# Patient Record
Sex: FEMALE | Race: OTHER | Employment: FULL TIME | URBAN - METROPOLITAN AREA
[De-identification: names, ages, dates, MRNs, and addresses within clinical notes are randomized per-mention and may not be internally consistent; named-entity substitution may affect disease eponyms.]

---

## 2020-02-25 PROBLEM — R10.9 RIGHT FLANK PAIN: Status: ACTIVE | Noted: 2020-02-25

## 2020-02-25 PROBLEM — N20.0 CALCULUS OF RIGHT KIDNEY: Status: ACTIVE | Noted: 2020-02-25

## 2020-03-18 ENCOUNTER — HOSPITAL ENCOUNTER (OUTPATIENT)
Dept: ULTRASOUND IMAGING | Age: 65
Discharge: HOME | End: 2020-03-18
Attending: UROLOGY
Payer: COMMERCIAL

## 2020-03-18 DIAGNOSIS — N20.0 CALCULUS OF RIGHT KIDNEY: ICD-10-CM

## 2020-03-18 PROCEDURE — 76770 US EXAM ABDO BACK WALL COMP: CPT

## 2020-03-19 NOTE — PROGRESS NOTES
Unclear reason for hydronephrosis but immediate post op URS  Will observe for now   CR stable  Pt asymptomatic.  F/u in 3 months

## 2020-05-20 ENCOUNTER — HOSPITAL ENCOUNTER (OUTPATIENT)
Dept: LAB | Age: 65
Discharge: HOME | End: 2020-05-20
Payer: COMMERCIAL

## 2020-05-20 ENCOUNTER — HOSPITAL ENCOUNTER (OUTPATIENT)
Dept: PREADMISSION TESTING | Age: 65
Discharge: HOME | End: 2020-05-20
Payer: COMMERCIAL

## 2020-05-20 DIAGNOSIS — N81.4 CYSTOCELE WITH PROLAPSE: ICD-10-CM

## 2020-05-20 DIAGNOSIS — Z01.818 PRE-OP TESTING: ICD-10-CM

## 2020-05-20 LAB
ABO + RH BLD: NORMAL
ANION GAP SERPL CALC-SCNC: 5 MMOL/L (ref 3–18)
APPEARANCE UR: CLEAR
ATRIAL RATE: 56 BPM
BILIRUB UR QL: NEGATIVE
BLOOD GROUP ANTIBODIES SERPL: NORMAL
BUN SERPL-MCNC: 17 MG/DL (ref 7–18)
BUN/CREAT SERPL: 18 (ref 12–20)
CALCIUM SERPL-MCNC: 9.1 MG/DL (ref 8.5–10.1)
CALCULATED P AXIS, ECG09: 58 DEGREES
CALCULATED R AXIS, ECG10: 15 DEGREES
CALCULATED T AXIS, ECG11: 58 DEGREES
CHLORIDE SERPL-SCNC: 106 MMOL/L (ref 100–111)
CO2 SERPL-SCNC: 28 MMOL/L (ref 21–32)
COLOR UR: YELLOW
CREAT SERPL-MCNC: 0.97 MG/DL (ref 0.6–1.3)
DIAGNOSIS, 93000: NORMAL
ERYTHROCYTE [DISTWIDTH] IN BLOOD BY AUTOMATED COUNT: 12.8 % (ref 11.6–14.5)
GLUCOSE SERPL-MCNC: 194 MG/DL (ref 74–99)
GLUCOSE UR STRIP.AUTO-MCNC: NEGATIVE MG/DL
HCT VFR BLD AUTO: 41.1 % (ref 35–45)
HGB BLD-MCNC: 14 G/DL (ref 12–16)
HGB UR QL STRIP: NEGATIVE
KETONES UR QL STRIP.AUTO: NEGATIVE MG/DL
LEUKOCYTE ESTERASE UR QL STRIP.AUTO: NEGATIVE
MCH RBC QN AUTO: 30.2 PG (ref 24–34)
MCHC RBC AUTO-ENTMCNC: 34.1 G/DL (ref 31–37)
MCV RBC AUTO: 88.6 FL (ref 74–97)
NITRITE UR QL STRIP.AUTO: NEGATIVE
P-R INTERVAL, ECG05: 214 MS
PH UR STRIP: 5 [PH] (ref 5–8)
PLATELET # BLD AUTO: 305 K/UL (ref 135–420)
PMV BLD AUTO: 10.4 FL (ref 9.2–11.8)
POTASSIUM SERPL-SCNC: 4.1 MMOL/L (ref 3.5–5.5)
PROT UR STRIP-MCNC: NEGATIVE MG/DL
Q-T INTERVAL, ECG07: 456 MS
QRS DURATION, ECG06: 98 MS
QTC CALCULATION (BEZET), ECG08: 440 MS
RBC # BLD AUTO: 4.64 M/UL (ref 4.2–5.3)
SODIUM SERPL-SCNC: 139 MMOL/L (ref 136–145)
SP GR UR REFRACTOMETRY: 1.02 (ref 1–1.03)
SPECIMEN EXP DATE BLD: NORMAL
UROBILINOGEN UR QL STRIP.AUTO: 0.2 EU/DL (ref 0.2–1)
VENTRICULAR RATE, ECG03: 56 BPM
WBC # BLD AUTO: 6 K/UL (ref 4.6–13.2)

## 2020-05-20 PROCEDURE — 93005 ELECTROCARDIOGRAM TRACING: CPT

## 2020-05-20 PROCEDURE — 36415 COLL VENOUS BLD VENIPUNCTURE: CPT

## 2020-05-20 PROCEDURE — 81003 URINALYSIS AUTO W/O SCOPE: CPT

## 2020-05-20 PROCEDURE — 86900 BLOOD TYPING SEROLOGIC ABO: CPT

## 2020-05-20 PROCEDURE — 87086 URINE CULTURE/COLONY COUNT: CPT

## 2020-05-20 PROCEDURE — 85027 COMPLETE CBC AUTOMATED: CPT

## 2020-05-20 PROCEDURE — 80048 BASIC METABOLIC PNL TOTAL CA: CPT

## 2020-05-21 LAB
BACTERIA SPEC CULT: NORMAL
SERVICE CMNT-IMP: NORMAL

## 2020-05-22 ENCOUNTER — HOSPITAL ENCOUNTER (OUTPATIENT)
Dept: LAB | Age: 65
Discharge: HOME | End: 2020-05-22
Payer: COMMERCIAL

## 2020-05-22 DIAGNOSIS — U07.1 COVID-19: ICD-10-CM

## 2020-05-22 PROCEDURE — 87635 SARS-COV-2 COVID-19 AMP PRB: CPT

## 2020-05-24 LAB — SARS-COV-2, COV2NT: NOT DETECTED

## 2020-05-27 ENCOUNTER — ANESTHESIA EVENT (OUTPATIENT)
Dept: SURGERY | Age: 65
DRG: 748 | End: 2020-05-27
Payer: COMMERCIAL

## 2020-05-28 ENCOUNTER — HOSPITAL ENCOUNTER (INPATIENT)
Age: 65
LOS: 1 days | Discharge: HOME | DRG: 748 | End: 2020-05-29
Attending: UROLOGY | Admitting: UROLOGY
Payer: COMMERCIAL

## 2020-05-28 ENCOUNTER — ANESTHESIA (OUTPATIENT)
Dept: SURGERY | Age: 65
DRG: 748 | End: 2020-05-28
Payer: COMMERCIAL

## 2020-05-28 DIAGNOSIS — N81.11 MIDLINE CYSTOCELE: Primary | ICD-10-CM

## 2020-05-28 PROBLEM — N81.9 PROLAPSE OF FEMALE PELVIC ORGANS: Status: ACTIVE | Noted: 2020-05-28

## 2020-05-28 PROCEDURE — 8E0W4CZ ROBOTIC ASSISTED PROCEDURE OF TRUNK REGION, PERCUTANEOUS ENDOSCOPIC APPROACH: ICD-10-PCS | Performed by: UROLOGY

## 2020-05-28 PROCEDURE — 77030039266 HC ADH SKN EXOFIN S2SG -A: Performed by: UROLOGY

## 2020-05-28 PROCEDURE — 77030018813 HC SCIS LAPSCP EPIX DISP AMR -B: Performed by: UROLOGY

## 2020-05-28 PROCEDURE — 77030002896 HC SUT CLP ABSRB J&J -B: Performed by: UROLOGY

## 2020-05-28 PROCEDURE — 74011250636 HC RX REV CODE- 250/636: Performed by: UROLOGY

## 2020-05-28 PROCEDURE — 99218 HC RM OBSERVATION: CPT

## 2020-05-28 PROCEDURE — 77030032490 HC SLV COMPR SCD KNE COVD -B: Performed by: UROLOGY

## 2020-05-28 PROCEDURE — 77030008477 HC STYL SATN SLP COVD -A: Performed by: ANESTHESIOLOGY

## 2020-05-28 PROCEDURE — 0UUG4JZ SUPPLEMENT VAGINA WITH SYNTHETIC SUBSTITUTE, PERCUTANEOUS ENDOSCOPIC APPROACH: ICD-10-PCS | Performed by: UROLOGY

## 2020-05-28 PROCEDURE — 74011250636 HC RX REV CODE- 250/636: Performed by: NURSE ANESTHETIST, CERTIFIED REGISTERED

## 2020-05-28 PROCEDURE — 77030008574 HC TBNG SUC IRR STRY -B: Performed by: UROLOGY

## 2020-05-28 PROCEDURE — 77010033678 HC OXYGEN DAILY

## 2020-05-28 PROCEDURE — 77030013079 HC BLNKT BAIR HGGR 3M -A: Performed by: ANESTHESIOLOGY

## 2020-05-28 PROCEDURE — 77030035277 HC OBTRTR BLDELSS DISP INTU -B: Performed by: UROLOGY

## 2020-05-28 PROCEDURE — 74011000272 HC RX REV CODE- 272: Performed by: UROLOGY

## 2020-05-28 PROCEDURE — 0DN84ZZ RELEASE SMALL INTESTINE, PERCUTANEOUS ENDOSCOPIC APPROACH: ICD-10-PCS | Performed by: UROLOGY

## 2020-05-28 PROCEDURE — 0DNN4ZZ RELEASE SIGMOID COLON, PERCUTANEOUS ENDOSCOPIC APPROACH: ICD-10-PCS | Performed by: UROLOGY

## 2020-05-28 PROCEDURE — 77030018842 HC SOL IRR SOD CL 9% BAXT -A: Performed by: UROLOGY

## 2020-05-28 PROCEDURE — 76210000016 HC OR PH I REC 1 TO 1.5 HR: Performed by: UROLOGY

## 2020-05-28 PROCEDURE — 77030022704 HC SUT VLOC COVD -B: Performed by: UROLOGY

## 2020-05-28 PROCEDURE — 74011250637 HC RX REV CODE- 250/637: Performed by: NURSE ANESTHETIST, CERTIFIED REGISTERED

## 2020-05-28 PROCEDURE — 74011000250 HC RX REV CODE- 250: Performed by: NURSE ANESTHETIST, CERTIFIED REGISTERED

## 2020-05-28 PROCEDURE — 0DNU4ZZ RELEASE OMENTUM, PERCUTANEOUS ENDOSCOPIC APPROACH: ICD-10-PCS | Performed by: UROLOGY

## 2020-05-28 PROCEDURE — 74011000250 HC RX REV CODE- 250: Performed by: UROLOGY

## 2020-05-28 PROCEDURE — C1781 MESH (IMPLANTABLE): HCPCS | Performed by: UROLOGY

## 2020-05-28 PROCEDURE — 76060000040 HC ANESTHESIA 4.5 TO 5 HR: Performed by: UROLOGY

## 2020-05-28 PROCEDURE — 77030008683 HC TU ET CUF COVD -A: Performed by: ANESTHESIOLOGY

## 2020-05-28 PROCEDURE — 77030016151 HC PROTCTR LNS DFOG COVD -B: Performed by: UROLOGY

## 2020-05-28 PROCEDURE — 0DNW4ZZ RELEASE PERITONEUM, PERCUTANEOUS ENDOSCOPIC APPROACH: ICD-10-PCS | Performed by: UROLOGY

## 2020-05-28 PROCEDURE — 76010000881 HC OR TIME 4.5 TO 5HR INTENSV - TIER 2: Performed by: UROLOGY

## 2020-05-28 PROCEDURE — 77030009848 HC PASSR SUT SET COOP -C: Performed by: UROLOGY

## 2020-05-28 PROCEDURE — 77030010545: Performed by: UROLOGY

## 2020-05-28 PROCEDURE — 77030040356 HC CORD BPLR FRCP COVD -A: Performed by: UROLOGY

## 2020-05-28 PROCEDURE — 77030010935 HC CLP LIG ABSRB TELE -B: Performed by: UROLOGY

## 2020-05-28 PROCEDURE — 77030027138 HC INCENT SPIROMETER -A

## 2020-05-28 PROCEDURE — 77030012770 HC TRCR OPT FX AMR -B: Performed by: UROLOGY

## 2020-05-28 PROCEDURE — 74011250637 HC RX REV CODE- 250/637: Performed by: UROLOGY

## 2020-05-28 PROCEDURE — 0USG4ZZ REPOSITION VAGINA, PERCUTANEOUS ENDOSCOPIC APPROACH: ICD-10-PCS | Performed by: UROLOGY

## 2020-05-28 PROCEDURE — 77030002924 HC SUT GORTX WLGO -B: Performed by: UROLOGY

## 2020-05-28 PROCEDURE — 77030002966 HC SUT PDS J&J -A: Performed by: UROLOGY

## 2020-05-28 PROCEDURE — 77030034696 HC CATH URETH FOL 2W BARD -A: Performed by: UROLOGY

## 2020-05-28 PROCEDURE — 77030008608 HC TRCR ENDOSC SMTH AMR -B: Performed by: UROLOGY

## 2020-05-28 PROCEDURE — 77030006984: Performed by: UROLOGY

## 2020-05-28 PROCEDURE — 77030031139 HC SUT VCRL2 J&J -A: Performed by: UROLOGY

## 2020-05-28 DEVICE — POLYPROPYLENE MESH FOR SACROCOLPOSUSPENSION/SACROCOLPOPEXY - Y CONTOUR™
Type: IMPLANTABLE DEVICE | Site: BLADDER | Status: FUNCTIONAL
Brand: RESTORELLE

## 2020-05-28 RX ORDER — PROPOFOL 10 MG/ML
INJECTION, EMULSION INTRAVENOUS AS NEEDED
Status: DISCONTINUED | OUTPATIENT
Start: 2020-05-28 | End: 2020-05-28 | Stop reason: HOSPADM

## 2020-05-28 RX ORDER — LATANOPROST 50 UG/ML
1 SOLUTION/ DROPS OPHTHALMIC 2 TIMES DAILY
Status: DISCONTINUED | OUTPATIENT
Start: 2020-05-28 | End: 2020-05-29 | Stop reason: HOSPADM

## 2020-05-28 RX ORDER — HYDRALAZINE HYDROCHLORIDE 20 MG/ML
INJECTION INTRAMUSCULAR; INTRAVENOUS AS NEEDED
Status: DISCONTINUED | OUTPATIENT
Start: 2020-05-28 | End: 2020-05-28 | Stop reason: HOSPADM

## 2020-05-28 RX ORDER — OXYCODONE AND ACETAMINOPHEN 5; 325 MG/1; MG/1
1 TABLET ORAL
Status: DISCONTINUED | OUTPATIENT
Start: 2020-05-28 | End: 2020-05-29 | Stop reason: HOSPADM

## 2020-05-28 RX ORDER — DOCUSATE SODIUM 100 MG/1
100 CAPSULE, LIQUID FILLED ORAL 2 TIMES DAILY
Status: DISCONTINUED | OUTPATIENT
Start: 2020-05-28 | End: 2020-05-29 | Stop reason: HOSPADM

## 2020-05-28 RX ORDER — LIDOCAINE HYDROCHLORIDE 20 MG/ML
INJECTION, SOLUTION EPIDURAL; INFILTRATION; INTRACAUDAL; PERINEURAL AS NEEDED
Status: DISCONTINUED | OUTPATIENT
Start: 2020-05-28 | End: 2020-05-28 | Stop reason: HOSPADM

## 2020-05-28 RX ORDER — OXYCODONE AND ACETAMINOPHEN 5; 325 MG/1; MG/1
1-2 TABLET ORAL
Qty: 20 TAB | Refills: 0 | Status: SHIPPED | OUTPATIENT
Start: 2020-05-28 | End: 2020-05-31

## 2020-05-28 RX ORDER — SODIUM CHLORIDE, SODIUM LACTATE, POTASSIUM CHLORIDE, CALCIUM CHLORIDE 600; 310; 30; 20 MG/100ML; MG/100ML; MG/100ML; MG/100ML
50 INJECTION, SOLUTION INTRAVENOUS CONTINUOUS
Status: DISCONTINUED | OUTPATIENT
Start: 2020-05-28 | End: 2020-05-28 | Stop reason: HOSPADM

## 2020-05-28 RX ORDER — VECURONIUM BROMIDE FOR INJECTION 1 MG/ML
INJECTION, POWDER, LYOPHILIZED, FOR SOLUTION INTRAVENOUS AS NEEDED
Status: DISCONTINUED | OUTPATIENT
Start: 2020-05-28 | End: 2020-05-28 | Stop reason: HOSPADM

## 2020-05-28 RX ORDER — LISINOPRIL 20 MG/1
30 TABLET ORAL DAILY
Status: DISCONTINUED | OUTPATIENT
Start: 2020-05-29 | End: 2020-05-29 | Stop reason: HOSPADM

## 2020-05-28 RX ORDER — CEFAZOLIN SODIUM 2 G/50ML
2 SOLUTION INTRAVENOUS
Status: COMPLETED | OUTPATIENT
Start: 2020-05-28 | End: 2020-05-28

## 2020-05-28 RX ORDER — FENTANYL CITRATE 50 UG/ML
50 INJECTION, SOLUTION INTRAMUSCULAR; INTRAVENOUS
Status: COMPLETED | OUTPATIENT
Start: 2020-05-28 | End: 2020-05-28

## 2020-05-28 RX ORDER — ONDANSETRON 2 MG/ML
INJECTION INTRAMUSCULAR; INTRAVENOUS AS NEEDED
Status: DISCONTINUED | OUTPATIENT
Start: 2020-05-28 | End: 2020-05-28 | Stop reason: HOSPADM

## 2020-05-28 RX ORDER — MIDAZOLAM HYDROCHLORIDE 1 MG/ML
INJECTION, SOLUTION INTRAMUSCULAR; INTRAVENOUS AS NEEDED
Status: DISCONTINUED | OUTPATIENT
Start: 2020-05-28 | End: 2020-05-28 | Stop reason: HOSPADM

## 2020-05-28 RX ORDER — MORPHINE SULFATE 2 MG/ML
2 INJECTION, SOLUTION INTRAMUSCULAR; INTRAVENOUS
Status: DISCONTINUED | OUTPATIENT
Start: 2020-05-28 | End: 2020-05-29 | Stop reason: HOSPADM

## 2020-05-28 RX ORDER — DOCUSATE SODIUM 100 MG/1
100 CAPSULE, LIQUID FILLED ORAL 2 TIMES DAILY
Qty: 40 CAP | Refills: 0 | Status: SHIPPED | OUTPATIENT
Start: 2020-05-28 | End: 2020-06-17

## 2020-05-28 RX ORDER — ATROPA BELLADONNA AND OPIUM 16.2; 3 MG/1; MG/1
1 SUPPOSITORY RECTAL
Status: DISCONTINUED | OUTPATIENT
Start: 2020-05-28 | End: 2020-05-29 | Stop reason: HOSPADM

## 2020-05-28 RX ORDER — GLYCOPYRROLATE 0.2 MG/ML
INJECTION INTRAMUSCULAR; INTRAVENOUS AS NEEDED
Status: DISCONTINUED | OUTPATIENT
Start: 2020-05-28 | End: 2020-05-28 | Stop reason: HOSPADM

## 2020-05-28 RX ORDER — SODIUM CHLORIDE, SODIUM LACTATE, POTASSIUM CHLORIDE, CALCIUM CHLORIDE 600; 310; 30; 20 MG/100ML; MG/100ML; MG/100ML; MG/100ML
100 INJECTION, SOLUTION INTRAVENOUS CONTINUOUS
Status: DISCONTINUED | OUTPATIENT
Start: 2020-05-28 | End: 2020-05-29 | Stop reason: HOSPADM

## 2020-05-28 RX ORDER — FENTANYL CITRATE 50 UG/ML
50 INJECTION, SOLUTION INTRAMUSCULAR; INTRAVENOUS AS NEEDED
Status: DISCONTINUED | OUTPATIENT
Start: 2020-05-28 | End: 2020-05-28 | Stop reason: HOSPADM

## 2020-05-28 RX ORDER — NALOXONE HYDROCHLORIDE 0.4 MG/ML
0.4 INJECTION, SOLUTION INTRAMUSCULAR; INTRAVENOUS; SUBCUTANEOUS AS NEEDED
Status: DISCONTINUED | OUTPATIENT
Start: 2020-05-28 | End: 2020-05-29 | Stop reason: HOSPADM

## 2020-05-28 RX ORDER — SODIUM CHLORIDE, SODIUM LACTATE, POTASSIUM CHLORIDE, CALCIUM CHLORIDE 600; 310; 30; 20 MG/100ML; MG/100ML; MG/100ML; MG/100ML
25 INJECTION, SOLUTION INTRAVENOUS CONTINUOUS
Status: DISCONTINUED | OUTPATIENT
Start: 2020-05-28 | End: 2020-05-28 | Stop reason: HOSPADM

## 2020-05-28 RX ORDER — SODIUM CHLORIDE 0.9 % (FLUSH) 0.9 %
5-40 SYRINGE (ML) INJECTION AS NEEDED
Status: DISCONTINUED | OUTPATIENT
Start: 2020-05-28 | End: 2020-05-29 | Stop reason: HOSPADM

## 2020-05-28 RX ORDER — SUCCINYLCHOLINE CHLORIDE 100 MG/5ML
SYRINGE (ML) INTRAVENOUS AS NEEDED
Status: DISCONTINUED | OUTPATIENT
Start: 2020-05-28 | End: 2020-05-28 | Stop reason: HOSPADM

## 2020-05-28 RX ORDER — DIPHENHYDRAMINE HCL 25 MG
25 CAPSULE ORAL
Status: DISCONTINUED | OUTPATIENT
Start: 2020-05-28 | End: 2020-05-29 | Stop reason: HOSPADM

## 2020-05-28 RX ORDER — OXYCODONE AND ACETAMINOPHEN 5; 325 MG/1; MG/1
1 TABLET ORAL AS NEEDED
Status: DISCONTINUED | OUTPATIENT
Start: 2020-05-28 | End: 2020-05-28 | Stop reason: HOSPADM

## 2020-05-28 RX ORDER — BUPIVACAINE HYDROCHLORIDE AND EPINEPHRINE 2.5; 5 MG/ML; UG/ML
INJECTION, SOLUTION EPIDURAL; INFILTRATION; INTRACAUDAL; PERINEURAL AS NEEDED
Status: DISCONTINUED | OUTPATIENT
Start: 2020-05-28 | End: 2020-05-28 | Stop reason: HOSPADM

## 2020-05-28 RX ORDER — MAGNESIUM SULFATE 100 %
4 CRYSTALS MISCELLANEOUS AS NEEDED
Status: DISCONTINUED | OUTPATIENT
Start: 2020-05-28 | End: 2020-05-28 | Stop reason: HOSPADM

## 2020-05-28 RX ORDER — CEFAZOLIN SODIUM 2 G/50ML
2 SOLUTION INTRAVENOUS EVERY 8 HOURS
Status: COMPLETED | OUTPATIENT
Start: 2020-05-28 | End: 2020-05-29

## 2020-05-28 RX ORDER — NEOSTIGMINE METHYLSULFATE 1 MG/ML
INJECTION, SOLUTION INTRAVENOUS AS NEEDED
Status: DISCONTINUED | OUTPATIENT
Start: 2020-05-28 | End: 2020-05-28 | Stop reason: HOSPADM

## 2020-05-28 RX ORDER — FENTANYL CITRATE 50 UG/ML
INJECTION, SOLUTION INTRAMUSCULAR; INTRAVENOUS AS NEEDED
Status: DISCONTINUED | OUTPATIENT
Start: 2020-05-28 | End: 2020-05-28 | Stop reason: HOSPADM

## 2020-05-28 RX ORDER — LIDOCAINE HYDROCHLORIDE 10 MG/ML
0.1 INJECTION, SOLUTION EPIDURAL; INFILTRATION; INTRACAUDAL; PERINEURAL AS NEEDED
Status: DISCONTINUED | OUTPATIENT
Start: 2020-05-28 | End: 2020-05-28 | Stop reason: HOSPADM

## 2020-05-28 RX ORDER — HYDRALAZINE HYDROCHLORIDE 20 MG/ML
5 INJECTION INTRAMUSCULAR; INTRAVENOUS
Status: COMPLETED | OUTPATIENT
Start: 2020-05-28 | End: 2020-05-28

## 2020-05-28 RX ORDER — FAMOTIDINE 20 MG/1
20 TABLET, FILM COATED ORAL ONCE
Status: COMPLETED | OUTPATIENT
Start: 2020-05-28 | End: 2020-05-28

## 2020-05-28 RX ORDER — ONDANSETRON 2 MG/ML
4 INJECTION INTRAMUSCULAR; INTRAVENOUS
Status: DISCONTINUED | OUTPATIENT
Start: 2020-05-28 | End: 2020-05-29 | Stop reason: HOSPADM

## 2020-05-28 RX ADMIN — VECURONIUM BROMIDE FOR INJECTION 5 MG: 1 INJECTION, POWDER, LYOPHILIZED, FOR SOLUTION INTRAVENOUS at 08:00

## 2020-05-28 RX ADMIN — OXYCODONE HYDROCHLORIDE AND ACETAMINOPHEN 1 TABLET: 5; 325 TABLET ORAL at 20:39

## 2020-05-28 RX ADMIN — FENTANYL CITRATE 50 MCG: 50 INJECTION, SOLUTION INTRAMUSCULAR; INTRAVENOUS at 08:41

## 2020-05-28 RX ADMIN — HYDRALAZINE HYDROCHLORIDE 5 MG: 20 INJECTION INTRAMUSCULAR; INTRAVENOUS at 13:06

## 2020-05-28 RX ADMIN — LIDOCAINE HYDROCHLORIDE 40 MG: 20 INJECTION, SOLUTION INTRAVENOUS at 07:50

## 2020-05-28 RX ADMIN — FAMOTIDINE 20 MG: 20 TABLET, FILM COATED ORAL at 06:45

## 2020-05-28 RX ADMIN — CEFAZOLIN 2 G: 10 INJECTION, POWDER, FOR SOLUTION INTRAVENOUS at 15:27

## 2020-05-28 RX ADMIN — Medication 100 MG: at 07:50

## 2020-05-28 RX ADMIN — CEFAZOLIN 2 G: 10 INJECTION, POWDER, FOR SOLUTION INTRAVENOUS at 23:53

## 2020-05-28 RX ADMIN — SODIUM CHLORIDE, SODIUM LACTATE, POTASSIUM CHLORIDE, AND CALCIUM CHLORIDE: 600; 310; 30; 20 INJECTION, SOLUTION INTRAVENOUS at 09:03

## 2020-05-28 RX ADMIN — HYDRALAZINE HYDROCHLORIDE 4 MG: 20 INJECTION INTRAMUSCULAR; INTRAVENOUS at 09:53

## 2020-05-28 RX ADMIN — VECURONIUM BROMIDE FOR INJECTION 1 MG: 1 INJECTION, POWDER, LYOPHILIZED, FOR SOLUTION INTRAVENOUS at 10:25

## 2020-05-28 RX ADMIN — CEFAZOLIN 2 G: 10 INJECTION, POWDER, FOR SOLUTION INTRAVENOUS at 08:05

## 2020-05-28 RX ADMIN — SODIUM CHLORIDE, SODIUM LACTATE, POTASSIUM CHLORIDE, AND CALCIUM CHLORIDE 100 ML/HR: 600; 310; 30; 20 INJECTION, SOLUTION INTRAVENOUS at 15:00

## 2020-05-28 RX ADMIN — PROPOFOL 150 MG: 10 INJECTION, EMULSION INTRAVENOUS at 07:50

## 2020-05-28 RX ADMIN — VECURONIUM BROMIDE FOR INJECTION 1 MG: 1 INJECTION, POWDER, LYOPHILIZED, FOR SOLUTION INTRAVENOUS at 08:52

## 2020-05-28 RX ADMIN — LATANOPROST 1 DROP: 50 SOLUTION OPHTHALMIC at 20:36

## 2020-05-28 RX ADMIN — FENTANYL CITRATE 50 MCG: 50 INJECTION, SOLUTION INTRAMUSCULAR; INTRAVENOUS at 13:10

## 2020-05-28 RX ADMIN — DOCUSATE SODIUM 100 MG: 100 CAPSULE, LIQUID FILLED ORAL at 17:41

## 2020-05-28 RX ADMIN — MIDAZOLAM 2 MG: 1 INJECTION INTRAMUSCULAR; INTRAVENOUS at 07:35

## 2020-05-28 RX ADMIN — VECURONIUM BROMIDE FOR INJECTION 1 MG: 1 INJECTION, POWDER, LYOPHILIZED, FOR SOLUTION INTRAVENOUS at 11:48

## 2020-05-28 RX ADMIN — FENTANYL CITRATE 100 MCG: 50 INJECTION, SOLUTION INTRAMUSCULAR; INTRAVENOUS at 07:50

## 2020-05-28 RX ADMIN — GLYCOPYRROLATE 0.4 MG: 0.2 INJECTION INTRAMUSCULAR; INTRAVENOUS at 12:09

## 2020-05-28 RX ADMIN — HYDRALAZINE HYDROCHLORIDE 4 MG: 20 INJECTION INTRAMUSCULAR; INTRAVENOUS at 09:33

## 2020-05-28 RX ADMIN — ONDANSETRON 4 MG: 2 SOLUTION INTRAMUSCULAR; INTRAVENOUS at 12:09

## 2020-05-28 RX ADMIN — Medication 3 MG: at 12:09

## 2020-05-28 RX ADMIN — FENTANYL CITRATE 50 MCG: 50 INJECTION, SOLUTION INTRAMUSCULAR; INTRAVENOUS at 13:00

## 2020-05-28 RX ADMIN — CEFAZOLIN 2 G: 10 INJECTION, POWDER, FOR SOLUTION INTRAVENOUS at 12:11

## 2020-05-28 RX ADMIN — SODIUM CHLORIDE, SODIUM LACTATE, POTASSIUM CHLORIDE, AND CALCIUM CHLORIDE 50 ML/HR: 600; 310; 30; 20 INJECTION, SOLUTION INTRAVENOUS at 06:53

## 2020-05-28 RX ADMIN — FENTANYL CITRATE 50 MCG: 50 INJECTION, SOLUTION INTRAMUSCULAR; INTRAVENOUS at 08:46

## 2020-05-28 RX ADMIN — OXYCODONE HYDROCHLORIDE AND ACETAMINOPHEN 1 TABLET: 5; 325 TABLET ORAL at 17:01

## 2020-05-28 NOTE — ANESTHESIA PREPROCEDURE EVALUATION
Relevant Problems   No relevant active problems       Anesthetic History   No history of anesthetic complications            Review of Systems / Medical History  Patient summary reviewed, nursing notes reviewed and pertinent labs reviewed    Pulmonary  Within defined limits                 Neuro/Psych   Within defined limits           Cardiovascular    Hypertension              Exercise tolerance: >4 METS     GI/Hepatic/Renal         Renal disease: stones       Endo/Other  Within defined limits           Other Findings              Physical Exam    Airway  Mallampati: II  TM Distance: 4 - 6 cm  Neck ROM: normal range of motion   Mouth opening: Normal     Cardiovascular  Regular rate and rhythm,  S1 and S2 normal,  no murmur, click, rub, or gallop  Rhythm: regular  Rate: normal         Dental  No notable dental hx       Pulmonary  Breath sounds clear to auscultation               Abdominal  GI exam deferred       Other Findings            Anesthetic Plan    ASA: 2  Anesthesia type: general          Induction: Intravenous  Anesthetic plan and risks discussed with: Patient

## 2020-05-28 NOTE — PERIOP NOTES
TRANSFER - OUT REPORT:    Verbal report given to esequiel rn(name) on Selene Orr  being transferred to 2406(unit) for routine post - op       Report consisted of patients Situation, Background, Assessment and   Recommendations(SBAR). Information from the following report(s) SBAR, OR Summary, Intake/Output and MAR was reviewed with the receiving nurse. Lines:   Peripheral IV 05/28/20 Left Hand (Active)   Site Assessment Clean, dry, & intact 5/28/2020 12:29 PM   Phlebitis Assessment 0 5/28/2020 12:29 PM   Infiltration Assessment 0 5/28/2020 12:29 PM   Dressing Status Clean, dry, & intact 5/28/2020 12:29 PM   Dressing Type Transparent;Tape 5/28/2020 12:29 PM   Hub Color/Line Status Pink;Capped 5/28/2020 12:29 PM   Alcohol Cap Used Yes 5/28/2020  6:52 AM       Peripheral IV 05/28/20 Anterior;Right Wrist (Active)   Site Assessment Clean, dry, & intact 5/28/2020 12:29 PM   Phlebitis Assessment 0 5/28/2020 12:29 PM   Infiltration Assessment 0 5/28/2020 12:29 PM   Dressing Status Clean, dry, & intact 5/28/2020 12:29 PM   Dressing Type Transparent;Tape 5/28/2020 12:29 PM   Hub Color/Line Status Infusing;Green 5/28/2020 12:29 PM        Opportunity for questions and clarification was provided. Patient transported with:   Registered Nurse   Patient transferred to 2406 in good condition friend Gregorio updated on status.

## 2020-05-28 NOTE — INTERVAL H&P NOTE
Update History & Physical    The Patient's History and Physical of May 2020,    was reviewed with the patient and I examined the patient. There was no change. The surgical site was confirmed by the patient and me. Plan:  The risk, benefits, expected outcome, and alternative to the recommended procedure have been discussed with the patient. Patient understands and wants to proceed with the procedure.     Electronically signed by Lalo Galeas MD on 5/28/2020 at 7:01 AM

## 2020-05-28 NOTE — H&P
1. Grade 3 Cystocele (SYMPTOMATIC)              Found on exam today. Discussed surgical options for prolapse: repair of cystocele w/ autologous fascia support, colpocleisis, robotic assisted laparoscopic sacrocolpopexy. R/B/As discussed. Plan for Robotic sacrocolpopexy, poss open. Letter sent. Urine sent for culture = NEG     2. 1.9cm Right Renal Pelvis Stone - s/p cysto, Rt RPG, Rt URS, Rt ureteral stent placement on 3/5/2020              Right ureteral stent removed in office today. The patient was told to drink large amounts of fluids to flush out any blood clots or other material. They will call if any pain, fever or other problems. Discussed general stone prevention.      RTO postoperatively   RENAL US POST OP IN FEW 3MONTHS      BMI 27.96        Discussion:  Cystocele Repair  The risks, benefits and alternatives to surgical intervention were discussed at length with the patient. Alternatives include no intervention, use of pessary, more traditional repair of cystocele, enterocele or rectocele with autologous fascia as support, the use of mesh for support,colpocleisis or the minimally invasive robotic assisted laparoscopic sacrocolpopexy. The patient is most interested in robotic surgery, so this was reviewed in the greatest detail. The intended benefit of mesh placement is to improve or relieve the pelvic organ prolapse symptoms. Risks of the procedure include, but are not limited to, bleeding, wound infection, injury to surrounding structures during the procedure including bowel, mesh erosion to the bladder or extrusion to the vaginal wall, or recurrence of the prolapse due to mesh loosening. If an infection develops around the mesh material, this can be difficult to treat.  If the bladder, rectum or urethra is injured during the procedure, this could result in the need for longer post-operative use of a catheter or termination of the procedure without placement of the mesh. The FDA warnings surrounding vaginal mesh were discussed. The patient expresses an understanding of these risks, benefits and alternatives, had all questions answered and requests that we proceed as planned with robotic sacrocolpopexy in the [facility] with intra-operative cystoscopy.     Empiric Stone Prevention Recommendations were discussed:   · Increase fluid consumption to make at least 2-2.5 litre of urine per day or consume at least 3 liters of fluid daily. · Decrease dietary salt intake (2 gm sodium daily) -- to lower urinary Calcium   · Avoid dark drinks (ie, Coffee, Tea, Catracho), nuts, chocolate -- to lower urinary Oxalate   · Lemonade: 4 oz lemon juice mixed with 2 litre water, sweetened to taste -- to increase urinary Citrate             Chief Complaint   Patient presents with   Akil Loser Kidney Stone       Pt here for stent removal.            History of Present Illness:  Brittany Juárez is a 59 y.o. female who presents today for right ureteral stent removal. Patient is s/p cysto, Right RPG, Right URS, laser litho, Right ureteral stent placement on 3/5/2020 for 1.9cm Right renal pelvic stone. Right ureteral stent was removed in office today. Patient also has a known bladder prolapse for the past few year. She has not tried any pessaries in the past and has not had any prior surgical repair.      Today, patient is doing well. She reports sensations of bulging within the vagina   Occasional pelvic pain   No vaginal discharge   Reports Prior hysterectomy  No prior Adominal surgeries     Denies any flank pain or renal colic  Tolerating stent without complication   Asymptomatic for infection today. Denies any gross hematuria or dysuria.   No N/V/F/C.      Neurological issues: NO   Pelvic or back issues: NO   Prior kidney or bladder surgeries: NO   Prior history of nephrolithiasis: NO           AUA-IPSS:  AUA Symptom Score 2/26/2020   Over the past month how often have you had the sensation that your bladder was not completely empty after you finished urinating? 0   Over the past month, how often have had to urinate again less than 2 hours after you last finished urinating? 0   Over the past month, how often have you found you stopped and started again several times when you urinated? 1   Over the past month, how often have you found it difficult to postpone urination? 0   Over the past month, how often have you had a weak urinary stream? 0   Over the past month, how often have you had to push or strain to begin urinating? 0   Over the past month, how many times did you most typically get up to urinate from the time you went to bed at night until the time you got up in the morning? 1   AUA Score 2   If you were to spend the rest of your life with your urinary condition the way it is now, how would you feel about that? Pleased         AUA Assessment Score:   ;    AUA Bother Rating:          History reviewed. No pertinent past medical history.     History reviewed.  No pertinent surgical history.        Current Outpatient Medications:     latanoprost (XALATAN) 0.005 % ophthalmic solution, 1 Drop., Disp: , Rfl:     lisinopril (PRINIVIL, ZESTRIL) 30 mg tablet, Take 30 mg by mouth., Disp: , Rfl:     ondansetron (ZOFRAN ODT) 4 mg disintegrating tablet, Take 4 mg by mouth., Disp: , Rfl:      No Known Allergies     Social History            Socioeconomic History    Marital status: SINGLE       Spouse name: Not on file    Number of children: Not on file    Years of education: Not on file    Highest education level: Not on file   Occupational History    Not on file   Social Needs    Financial resource strain: Not on file    Food insecurity       Worry: Not on file       Inability: Not on file    Transportation needs       Medical: Not on file       Non-medical: Not on file   Tobacco Use    Smoking status: Never Smoker    Smokeless tobacco: Never Used   Substance and Sexual Activity    Alcohol use: Not on file    Drug use: Not on file    Sexual activity: Not on file   Lifestyle    Physical activity       Days per week: Not on file       Minutes per session: Not on file    Stress: Not on file   Relationships    Social connections       Talks on phone: Not on file       Gets together: Not on file       Attends Restoration service: Not on file       Active member of club or organization: Not on file       Attends meetings of clubs or organizations: Not on file       Relationship status: Not on file    Intimate partner violence       Fear of current or ex partner: Not on file       Emotionally abused: Not on file       Physically abused: Not on file       Forced sexual activity: Not on file   Other Topics Concern    Not on file   Social History Narrative    Not on file         History reviewed. No pertinent family history.        Review of Systems  Constitutional: Fever: No  Skin: Rash: No  HEENT: Hearing difficulty: No  Eyes: Blurred vision: No  Cardiovascular: Chest pain: No  Respiratory: Shortness of breath: No  Gastrointestinal: Nausea/vomiting: No  Musculoskeletal: Back pain: No  Neurological: Weakness: No  Psychological: Memory loss: No  Comments/additional findings:   All other systems reviewed and are negative        PHYSICAL EXAMINATION:      Visit Vitals  /72   Ht 5' 5\" (1.651 m)   Wt 168 lb (76.2 kg)   BMI 27.96 kg/m²      Constitutional: Well developed, well-nourished female in no acute distress. CV:  No peripheral swelling noted  Respiratory: No respiratory distress or difficulties  Abdomen:  Soft and nontender. No masses. No hepatosplenomegaly, No CVA tenderness, no Suprapubic tenderness.  Female:    Demonstrable Stress Urinary Incontinence: NO  Pelvic muscles tenderness: NO.  Prior Hysterectomy: YES  Pelvic organ prolapse: YES              Cystocele:YES, grade 3              Rectocele:NO  Skin:  Normal color. No evidence of jaundice.      Neuro/Psych:  Patient with appropriate affect. Alert and oriented. Lymphatic:   No enlargement of supraclavicular lymph nodes.   LE: No edema.     Labs & Imaging:         Results for orders placed or performed in visit on 03/13/20   AMB POC URINALYSIS DIP STICK AUTO W/O MICRO   Result Value Ref Range     Color (UA POC) Yellow       Clarity (UA POC) Slightly Cloudy       Glucose (UA POC) Negative Negative     Bilirubin (UA POC) Negative Negative     Ketones (UA POC) Negative Negative     Specific gravity (UA POC) 1.025 1.001 - 1.035     Blood (UA POC) 3+ Negative     pH (UA POC) 5.5 4.6 - 8.0     Protein (UA POC) 3+ Negative     Urobilinogen (UA POC) 0.2 mg/dL 0.2 - 1     Nitrites (UA POC) Negative Negative     Leukocyte esterase (UA POC) 1+ Negative                     A copy of today's office visit with all pertinent imaging results and labs were sent to the referring SHELLY Hensley MD

## 2020-05-28 NOTE — OP NOTES
LOCATION: [facility]     OPERATIVE REPORT     Name: Radha Lujan  MRN#: [id number]  : 1955    Date of Surgery: 2020        PREOPERATIVE DIAGNOSES  1. Pelvic organ prolapse post-hysterectomy (cystocele grade 3, rectocele grade 2). 2. Postmenopausal, Post Hysterectomy - Atrophic Vaginitis       POSTOPERATIVE DIAGNOSES  1. Same as above     PROCEDURES PERFORMED  1. Robotic Laparoscopic sacralcolpopexy. 2. Extensive Lysis of adhesion  3. Vaginal Examination under anesthesia    SURGEON: Estela Suarez MD    SURGICAL ASSISTANT: Maegan Andre     ESTIMATED BLOOD LOSS: 20 mL.     SPECIMENS REMOVED: None     ANESTHESIA: General.     ANTIBIOTICS: Ancef     COMPLICATIONS: None.     DRAINS: A 16-Stateless Duong catheter.     DISPOSITION: To PACU and to the floor. IMPLANTS: Y-MESH (Restorelle by [employer] )     INDICATIONS: Radha Lujan is a 59 y. o.female post-hysterectomy,   who has pelvic organ prolapse. Cystocele grade III and rectocele Grade II. She has had no prior repair. The patient presented to me with an overactive bladder  And a symptomatic vaginal bulge. After  an extensive discussion, the decision was made to perform a robotic sacral  colpopexy for pelvic organ prolapse. Risks, benefits, alternatives were discussed including injury to the surrounding structures, vagina, bladder, urethra, rectum, bleeding, infection, post operative pain, bowel injury, possible need for a second operation, [employer] Stress incontinence. We also talked about he USPTF and SUFU / AUGS statements about the vaginal mesh. The vaginal mesh issues are unrelated to the mesh used for sacrocolpopexy. She understands and wishes to proceed and consent was obtained.     DESCRIPTION OF PROCEDURE: The  patient was brought into the operating room and placed in the supine position. Anesthesia was administered. She received a dose of  intravenous antibiotics.  She was placed into the dorsal lithotomy  position, and prepped and draped in the normal sterile fashion after all  pressure points were properly padded and she was secured to the  table. We performed a timeout. We then obtained insufflation into the abdomen  using a Veress needle. The umbilical and left lateral abdominal trocars were placed in standard fashion. Laparoscopic evaluation of the abdomen revealed several adhesions to the umbilicus and to the right abdominal lateral wall. I performed lysis of adhesion with care and the omentum was released from the abdominal wall with electrocautery and blunt dissection. There was no injury to the bowel. The remaining robotic trocars were placed in the standard  Fashion, and a 12mm trocar assistant port was placed in the Right upper quadrant. The patient was placed in trendelenburg and [facility] robot was docked. We encountered adhesion of bowel and omentum to the anterior abdominal wall and the pelvic side walls. These scar tissues were taking down sharply.      With the small bowel and and the sigmoid colon free up nicely, the sigmoid colon was retracted with robotic Cadiere grasper. Again, we  had good visualization of the pelvis. The vaginal stent was placed by the assistant. Attention was turned to the sacral promontory. We dissected down to the anterior  longitudinal ligament by using electrocautery and bipolar scissors. Good hemostasis was obtained. I then carried an incision along the  lateral side wall, staying medial to the ureter, but lateral to the sigmoid  and rectum. Care was taken not to injure the ureter. With the vaginal  stent in place, we carried down the vaginal dissection. We mobilized  the bladder off the vagina and carried down the dissection bluntly and  sharply with electrocautery also. There was no vaginal injury. We took down the bladder dissection roughly 6 cm, which was probably  close to the trigone. Similar dissection was carried out posteriorly to reflect  the rectum off the vagina. We followed the vagina until we got to the perirectal fat. There was no  injury noted to the rectum. We then connected the right lateral pelvic  wall incision to the vagina. Using Y graft (monofilament polypropylene mesh)  the Y-arms were trimmed to 6 cm. I then began securing the Y-  mesh onto the vaginal wall. We started with the apex using 2-0 [facility] in an interrupted fashion. We had two rows of four total anteriorly  and two rows of four posteriorly. The tail of the mesh was then secured down to the Anterior longitudinal ligament overlying the sacral promontory  using 2-0 [place]-Vin times. Excess tension was avoided and appropriate suspension was confirmed. Excess mesh was trimmed. Hemostasis was  obtained. The peritoneum was closed using a V-Loc suture in a  running fashion. Good hemostasis obtained. The robotic trocars and instruments were then  removed after the abdomen was deflated. Attention was turned to the  vaginal incision, and this was closed with a 3-0 Vicryl in a running and  locking fashion. A 16-Martiniquais Duong catheter was then left in place. Vaginal packing with Premarin was left in place also.  The patient was  awakened from anesthesia and transferred to the PACU in stable  condition.           Anette Morales MD  5/28/2020

## 2020-05-28 NOTE — PERIOP NOTES
Pre-Op Summary    Pt arrived via car with family/friend and is oriented to time, place, person and situation. Patient with steady gait with none assistive devices. Visit Vitals  BP (!) 193/93 (BP 1 Location: Left arm, BP Patient Position: At rest)   Pulse 66   Temp 97.9 °F (36.6 °C)   Resp 18   Ht 5' 5\" (1.651 m)   Wt 78.2 kg (172 lb 8 oz)   LMP 01/01/1985 Comment: Partial Hysterectomy   SpO2 100%   BMI 28.71 kg/m²       Peripheral IV located on Left hand . Patients belongings are located locked in store room. Patient's point of contact is Celeste Vasquez, friend and their contact number is: [phone]. They will be called as needed. They are able to receive medication information. They will be admitted.

## 2020-05-28 NOTE — ANESTHESIA POSTPROCEDURE EVALUATION
Procedure(s):  1. Robotic Laparoscopic sacralcolpopexy. 2. Extensive Lysis of adhesion 3. Vaginal Examination under anesthe. general    Anesthesia Post Evaluation      Multimodal analgesia: multimodal analgesia used between 6 hours prior to anesthesia start to PACU discharge  Patient location during evaluation: bedside  Patient participation: complete - patient participated  Level of consciousness: awake  Pain management: adequate  Airway patency: patent  Anesthetic complications: no  Cardiovascular status: stable  Respiratory status: acceptable  Hydration status: acceptable  Post anesthesia nausea and vomiting:  controlled      INITIAL Post-op Vital signs:   Vitals Value Taken Time   /76 5/28/2020  1:26 PM   Temp 36.6 °C (97.8 °F) 5/28/2020  1:26 PM   Pulse 75 5/28/2020  1:32 PM   Resp 24 5/28/2020  1:32 PM   SpO2 93 % 5/28/2020  1:33 PM   Vitals shown include unvalidated device data.

## 2020-05-28 NOTE — PROGRESS NOTES
conducted a pre-surgery visit with Jennifer Dumont, who is a 59 y.o.,female. The  provided the following Interventions:  Initiated a relationship of care and support. Offered prayer and assurance of continued prayers on patient's behalf. Plan:  Chaplains will continue to follow and will provide pastoral care on an as needed/requested basis.  recommends bedside caregivers page  on duty if patient shows signs of acute spiritual or emotional distress.     Amy Brambila   Spiritual Care   [phone]

## 2020-05-28 NOTE — ROUTINE PROCESS
Patient received via bed, garcia in place  Patient instructed on using I.S.   Percocet given for pain  Patient sleeping  Tolerated clears well  Bedside shift report given to [facility] with ramonar

## 2020-05-29 VITALS
SYSTOLIC BLOOD PRESSURE: 160 MMHG | RESPIRATION RATE: 18 BRPM | TEMPERATURE: 98.2 F | HEART RATE: 60 BPM | OXYGEN SATURATION: 97 % | DIASTOLIC BLOOD PRESSURE: 77 MMHG | HEIGHT: 65 IN | WEIGHT: 172.5 LBS | BODY MASS INDEX: 28.74 KG/M2

## 2020-05-29 PROCEDURE — 51798 US URINE CAPACITY MEASURE: CPT

## 2020-05-29 PROCEDURE — 74011250636 HC RX REV CODE- 250/636: Performed by: UROLOGY

## 2020-05-29 PROCEDURE — 65270000029 HC RM PRIVATE

## 2020-05-29 PROCEDURE — 99218 HC RM OBSERVATION: CPT

## 2020-05-29 PROCEDURE — 74011250637 HC RX REV CODE- 250/637: Performed by: UROLOGY

## 2020-05-29 RX ADMIN — LISINOPRIL 30 MG: 20 TABLET ORAL at 09:11

## 2020-05-29 RX ADMIN — OXYCODONE HYDROCHLORIDE AND ACETAMINOPHEN 1 TABLET: 5; 325 TABLET ORAL at 09:10

## 2020-05-29 RX ADMIN — CEFAZOLIN 2 G: 10 INJECTION, POWDER, FOR SOLUTION INTRAVENOUS at 09:12

## 2020-05-29 RX ADMIN — OXYCODONE HYDROCHLORIDE AND ACETAMINOPHEN 1 TABLET: 5; 325 TABLET ORAL at 04:32

## 2020-05-29 RX ADMIN — DOCUSATE SODIUM 100 MG: 100 CAPSULE, LIQUID FILLED ORAL at 09:10

## 2020-05-29 RX ADMIN — LATANOPROST 1 DROP: 50 SOLUTION OPHTHALMIC at 09:00

## 2020-05-29 NOTE — PROGRESS NOTES
Problem: Falls - Risk of  Goal: *Absence of Falls  Description: Document Adam Vuong Fall Risk and appropriate interventions in the flowsheet.   Outcome: Progressing Towards Goal  Note: Fall Risk Interventions:  Mobility Interventions: Communicate number of staff needed for ambulation/transfer, Utilize walker, cane, or other assistive device, Utilize gait belt for transfers/ambulation         Medication Interventions: Evaluate medications/consider consulting pharmacy, Patient to call before getting OOB, Teach patient to arise slowly    Elimination Interventions: Call light in reach              Problem: Patient Education: Go to Patient Education Activity  Goal: Patient/Family Education  Outcome: Progressing Towards Goal     Problem: Pain  Goal: *Control of Pain  Outcome: Progressing Towards Goal     Problem: Patient Education: Go to Patient Education Activity  Goal: Patient/Family Education  Outcome: Progressing Towards Goal

## 2020-05-29 NOTE — PROGRESS NOTES
5125 Bedside and Verbal shift change report given to THOMAS Hardy (oncoming nurse) by Alban Cao RN (offgoing nurse).  Report included the following information SBAR, Kardex, Intake/Output and MAR.     6528 Received patient lying on bed, alert and oriented x4, no signs of respiratory distress, nausea and vomiting, denies pain at this time, IV sites checked and patent, no signs of infiltration, abd incisions clean dry intact, garcia out c/o saleem rn at 7am, dtv at 11am to 1300, to call md if post void at 2nd is >150cc,  bed in low position and locked,  call bell in reach, continue to monitor    0900 due meds given, pain medication given      1200 pt to be discharged, discharge instructions given and verbalized understanding, voided again and good output, bladder scan 5cc, can go home

## 2020-05-29 NOTE — PROGRESS NOTES
Problem: Discharge Planning  Goal: *Discharge to safe environment  Outcome: Resolved/Met   Plan home    Pt dc'd home, no services ordered. Reason for Admission:   Pelvic prolapse                   RUR Score:       n/a              Plan for utilizing home health:      no    PCP: First and Last name:  Marcelo Denise   Name of Practice:    Are you a current patient: Yes/No: yes   Approximate date of last visit: march 2020                    Current Advanced Directive/Advance Care Plan: none, does not want one                         Transition of Care Plan:    Spoke with pt, lives alone. Independent with adls and amb. no dme. nok is son mariam cardenas. [place], she does not remember ph number. Demographics correct. She designates her sons father Alcira Gorman, they are not , for dcp, transport home. She was given obs letter, refused to sign it. She was then changed after to inpt status. Patient has designated _________friend_______________ to participate in his/her discharge plan and to receive any needed information. Name: Alcira Gorman  Address:  Phone number:[phone]    Patient and/or next of kin has been given the Outpatient Observation Information and Notification letter and all questions answered. Pt changed to inpt. Care Management Interventions  PCP Verified by CM: Yes  Palliative Care Criteria Met (RRAT>21 & CHF Dx)?: No  Mode of Transport at Discharge:  Other (see comment)  Transition of Care Consult (CM Consult): Discharge Planning  Discharge Durable Medical Equipment: No  Physical Therapy Consult: No  Occupational Therapy Consult: No  Speech Therapy Consult: No  Current Support Network: Lives Alone  Confirm Follow Up Transport: Friends  The Patient and/or Patient Representative was Provided with a Choice of Provider and Agrees with the Discharge Plan?: No  Freedom of Choice List was Provided with Basic Dialogue that Supports the Patient's Individualized Plan of Care/Goals, Treatment Preferences and Shares the Quality Data Associated with the Providers?: No  Discharge Location  Discharge Placement: Home

## 2020-05-29 NOTE — DISCHARGE INSTRUCTIONS
DISCHARGE SUMMARY from Nurse    PATIENT INSTRUCTIONS:    After general anesthesia or intravenous sedation, for 24 hours or while taking prescription Narcotics:  · Limit your activities  · Do not drive and operate hazardous machinery  · Do not make important personal or business decisions  · Do  not drink alcoholic beverages  · If you have not urinated within 8 hours after discharge, please contact your surgeon on call. Report the following to your surgeon:  · Excessive pain, swelling, redness or odor of or around the surgical area  · Temperature over 100.5  · Nausea and vomiting lasting longer than 4 hours or if unable to take medications  · Any signs of decreased circulation or nerve impairment to extremity: change in color, persistent  numbness, tingling, coldness or increase pain  · Any questions    What to do at Home:  Recommended activity: Activity as tolerated,     If you experience any of the following symptoms listed below , please follow up with your doctor. *  Please give a list of your current medications to your Primary Care Provider. *  Please update this list whenever your medications are discontinued, doses are      changed, or new medications (including over-the-counter products) are added. *  Please carry medication information at all times in case of emergency situations. These are general instructions for a healthy lifestyle:    No smoking/ No tobacco products/ Avoid exposure to second hand smoke  Surgeon General's Warning:  Quitting smoking now greatly reduces serious risk to your health.     Obesity, smoking, and sedentary lifestyle greatly increases your risk for illness    A healthy diet, regular physical exercise & weight monitoring are important for maintaining a healthy lifestyle    You may be retaining fluid if you have a history of heart failure or if you experience any of the following symptoms:  Weight gain of 3 pounds or more overnight or 5 pounds in a week, increased swelling in our hands or feet or shortness of breath while lying flat in bed. Please call your doctor as soon as you notice any of these symptoms; do not wait until your next office visit. The discharge information has been reviewed with the patient. The patient verbalized understanding. Discharge medications reviewed with the patient and appropriate educational materials and side effects teaching were provided.       Patient armband removed and shredded        ___________________________________________________________________________________________________________________________________

## 2020-05-29 NOTE — PROGRESS NOTES
1919: Patient in bed awake,alert and oriented x4. No signs of distress . Bed low and locked. Call bell within reach. Will monitor. 2155: Place a call for Dr Cortney Ordoñez on call re : garcia removal ,he says \" can remove garcia at 0700 am \".    0630: In bed sleeping,uneventful night,no other concern at this time,call bell within reach. Will continue monitoring. Paged Dr. Meena Barnes ,re: to confirm garcia and he says I can remove at 7 AM.    0700: D/C garcia per MD order. Patient Vitals for the past 12 hrs:   Temp Pulse Resp BP SpO2   05/29/20 0745 98.1 °F (36.7 °C) 78 18 134/69 96 %   05/29/20 0507 98.3 °F (36.8 °C) 64 18 110/66 95 %   05/29/20 0055 98.9 °F (37.2 °C) 89 18 117/62 96 %     Bedside and Verbal shift change report given to Cruz Hardy (oncoming nurse) by Reena Fleming (offgoing nurse). Report included the following information SBAR, Kardex, MAR and Recent Results.

## 2020-05-29 NOTE — DISCHARGE SUMMARY
Discharge Summary    Patient: Wagner Matthew               Sex: female          DOA: 5/28/2020         YOB: 1955      Age:  59 y.o.        LOS:  LOS: 0 days                Admit Date: 5/28/2020    Discharge Date: 5/29/2020    Admission Diagnoses: Prolapse of female pelvic organs [N81.9]    Discharge Diagnoses:    Problem List as of 5/29/2020 Date Reviewed: 5/27/2020          Codes Class Noted - Resolved    Right flank pain ICD-10-CM: R10.9  ICD-9-CM: 789.09  2/25/2020 - Present        Calculus of right kidney ICD-10-CM: N20.0  ICD-9-CM: 592.0  2/25/2020 - Present        Prolapse of female pelvic organs ICD-10-CM: N81.9  ICD-9-CM: 618.9  5/28/2020 - Present              Discharge Condition: Stable    Hospital Course: Unremarkable: See chart for further detials           Progress Note    Patient: Wagner Matthew MRN: [id number]  SSN: [id number]    YOB: 1955  Age: 59 y.o. Sex: female      Admit Date: 5/28/2020    LOS: 0 days     Subjective:     No events, no complaints    Objective:     Vitals:    05/28/20 1947 05/29/20 0055 05/29/20 0507 05/29/20 0745   BP: 145/78 117/62 110/66 134/69   Pulse: 79 89 64 78   Resp: 16 18 18 18   Temp: 98.2 °F (36.8 °C) 98.9 °F (37.2 °C) 98.3 °F (36.8 °C) 98.1 °F (36.7 °C)   SpO2: 98% 96% 95% 96%   Weight:       Height:            Intake and Output:  Current Shift: No intake/output data recorded. Last three shifts: 05/27 1901 - 05/29 0700  In: 4600 [P.O.:650;  I.V.:3950]  Out: 6928 [Urine:4085]    Physical Exam:   GENERAL: alert, cooperative, no distress, appears stated age  LUNG: unlabored  HEART: regular rate and rhythm  ABDOMEN: Soft, Non tender  EXTREMITIES:  extremities normal, atraumatic, no cyanosis or edema  SKIN: Normal.  PSYCHIATRIC: non focal  INCISION: clean, dry, intact    Lab/Data Review:    Lab Results   Component Value Date/Time    WBC 6.0 05/20/2020 09:44 AM    HGB 14.0 05/20/2020 09:44 AM    HCT 41.1 05/20/2020 09:44 AM    PLATELET 512 05/20/2020 09:44 AM    MCV 88.6 05/20/2020 09:44 AM       Lab Results   Component Value Date/Time    Sodium 139 05/20/2020 09:44 AM    Potassium 4.1 05/20/2020 09:44 AM    Chloride 106 05/20/2020 09:44 AM    CO2 28 05/20/2020 09:44 AM    Anion gap 5 05/20/2020 09:44 AM    Glucose 194 (H) 05/20/2020 09:44 AM    BUN 17 05/20/2020 09:44 AM    Creatinine 0.97 05/20/2020 09:44 AM    BUN/Creatinine ratio 18 05/20/2020 09:44 AM    GFR est AA >60 05/20/2020 09:44 AM    GFR est non-AA 58 (L) 05/20/2020 09:44 AM    Calcium 9.1 05/20/2020 09:44 AM       POD 1 s/p RA Lap Sacrocolpopexy    Assessment:     Active Problems:    Prolapse of female pelvic organs (5/28/2020)        Plan: Duong out  Reg diet  PO pain control  UOOB  Home if PVR  <200    Signed By: Danny Kamara MD     May 29, 2020              Consults: None    Significant Diagnostic Studies: None    Discharge Medications:     Current Discharge Medication List      START taking these medications    Details   oxyCODONE-acetaminophen (PERCOCET) 5-325 mg per tablet Take 1-2 Tabs by mouth every six (6) hours as needed for Pain for up to 3 days. Max Daily Amount: 8 Tabs. Qty: 20 Tab, Refills: 0    Associated Diagnoses: Midline cystocele      docusate sodium (Colace) 100 mg capsule Take 1 Cap by mouth two (2) times a day for 20 days. Qty: 40 Cap, Refills: 0         CONTINUE these medications which have NOT CHANGED    Details   latanoprost (XALATAN) 0.005 % ophthalmic solution Administer 1 Drop to both eyes two (2) times a day. lisinopril (PRINIVIL, ZESTRIL) 30 mg tablet Take 30 mg by mouth. Activity: As tolerated    Diet: Regular    Wound Care:  May shower    Follow-up: As scheduled

## (undated) DEVICE — LIGHT HANDLE: Brand: DEVON

## (undated) DEVICE — REM POLYHESIVE ADULT PATIENT RETURN ELECTRODE: Brand: VALLEYLAB

## (undated) DEVICE — KENDALL SCD EXPRESS SLEEVES, KNEE LENGTH, MEDIUM: Brand: KENDALL SCD

## (undated) DEVICE — INSTRMT SET WND CLSR SUT PASS --

## (undated) DEVICE — TIP COVER ACCESSORY

## (undated) DEVICE — SUTURE VCRL SZ 3-0 L27IN ABSRB UD L26MM SH 1/2 CIR J416H

## (undated) DEVICE — SYR 10ML CTRL LR LCK NSAF LF --

## (undated) DEVICE — APPLICATOR BNDG 1MM ADH PREMIERPRO EXOFIN

## (undated) DEVICE — BODY ALIGNER: Brand: DEROYAL

## (undated) DEVICE — DEVON™ KNEE AND BODY STRAP 60" X 3" (1.5 M X 7.6 CM): Brand: DEVON

## (undated) DEVICE — SUTURE PDS II SZ 3-0 L27IN ABSRB UD FS-1 L24MM 3/8 CIR REV Z442H

## (undated) DEVICE — TROCAR: Brand: KII® OPTICAL ACCESS SYSTEM

## (undated) DEVICE — BIPOLAR FORCEPS CORD: Brand: VALLEYLAB

## (undated) DEVICE — SHEET,DRAPE,70X100,STERILE: Brand: MEDLINE

## (undated) DEVICE — BAG DRAIN URIN 2000ML LF STRL -- CONVERT TO ITEM 363123

## (undated) DEVICE — INTENDED FOR TISSUE SEPARATION, AND OTHER PROCEDURES THAT REQUIRE A SHARP SURGICAL BLADE TO PUNCTURE OR CUT.: Brand: BARD-PARKER SAFETY BLADES SIZE 15, STERILE

## (undated) DEVICE — 40418 TRENDELENBURG ONE-STEP ARM PROTECTORS LARGE (1 PAIR): Brand: 40418 TRENDELENBURG ONE-STEP ARM PROTECTORS LARGE (1 PAIR)

## (undated) DEVICE — TAPE,CLOTH/SILK,CURAD,3"X10YD,LF,40/CS: Brand: CURAD

## (undated) DEVICE — Device

## (undated) DEVICE — CATHETER F BLLN 5CC 16FR 2 W HYDRGEL COAT LESS TRAUM LUB

## (undated) DEVICE — (D)PREP SKN CHLRAPRP APPL 26ML -- CONVERT TO ITEM 371833

## (undated) DEVICE — VISUALIZATION SYSTEM: Brand: CLEARIFY

## (undated) DEVICE — 40580 - THE PINK PAD - ADVANCED TRENDELENBURG POSITIONING KIT: Brand: 40580 - THE PINK PAD - ADVANCED TRENDELENBURG POSITIONING KIT

## (undated) DEVICE — SUTURE GOR TX SZ 0 L36IN NONABSORBABLE L26MM THX-26 1/2 CIR 2N05A

## (undated) DEVICE — LAPAROSCOPIC SCISSORS: Brand: EPIX LAPAROSCOPIC SCISSORS

## (undated) DEVICE — TRAY PREP DRY W/ PREM GLV 2 APPL 6 SPNG 2 UNDPD 1 OVERWRAP

## (undated) DEVICE — LUB SURG MEDC STRL 2OZ TUBE MC -- MEDICHOICE

## (undated) DEVICE — STERILE POLYISOPRENE POWDER-FREE SURGICAL GLOVES: Brand: PROTEXIS

## (undated) DEVICE — SOL IRR NACL 0.9% 500ML POUR --

## (undated) DEVICE — MEDI-VAC NON-CONDUCTIVE SUCTION TUBING: Brand: CARDINAL HEALTH

## (undated) DEVICE — OBTRTR BLDELSS 8MM DISP -- DA VINCI - SNGL USE

## (undated) DEVICE — CLIP SUT ENDOSCP F/2-0/3-0/4-0 -- LAPRA-TY

## (undated) DEVICE — LAPAROSCOPIC TROCAR SLEEVE/SINGLE USE: Brand: KII® SLEEVE

## (undated) DEVICE — DISPOSABLE SUCTION/IRRIGATOR TUBE SET WITH TIP: Brand: AHTO

## (undated) DEVICE — CLIP LIG ABSRB HEM-LOK LG PUR --

## (undated) DEVICE — SKIN MARKER,REGULAR TIP WITH RULER AND LABELS: Brand: DEVON

## (undated) DEVICE — SUTURE DEV SZ 3-0 V-LOC 90 L12IN TO L18IN CV-23 VLT VLOCM0844

## (undated) DEVICE — KIT,ANTI FOG,W/SPONGE & FLUID,SOFT PACK: Brand: MEDLINE

## (undated) DEVICE — ELECTRO LUBE IS A SINGLE PATIENT USE DEVICE THAT IS INTENDED TO BE USED ON ELECTROSURGICAL ELECTRODES TO REDUCE STICKING.: Brand: KEY SURGICAL ELECTRO LUBE

## (undated) DEVICE — DEVICE SECUREMENT AD W/ TRICOT ANCHR PD FOR F LTX SIL CATH

## (undated) DEVICE — NEEDLE HYPO 25GA L1.5IN BVL ORIENTED ECLIPSE

## (undated) DEVICE — DRAPE KIT ACCS 4-ARM DISP -- DA VINCI

## (undated) DEVICE — LAPAROSCOPIC TROCAR SLEEVE/SINGLE USE: Brand: KII® OPTICAL ACCESS SYSTEM